# Patient Record
Sex: FEMALE | Race: WHITE | HISPANIC OR LATINO | Employment: FULL TIME | URBAN - METROPOLITAN AREA
[De-identification: names, ages, dates, MRNs, and addresses within clinical notes are randomized per-mention and may not be internally consistent; named-entity substitution may affect disease eponyms.]

---

## 2017-06-29 ENCOUNTER — APPOINTMENT (OUTPATIENT)
Dept: RADIOLOGY | Facility: MEDICAL CENTER | Age: 42
End: 2017-06-29
Attending: EMERGENCY MEDICINE
Payer: MEDICAID

## 2017-06-29 ENCOUNTER — HOSPITAL ENCOUNTER (EMERGENCY)
Facility: MEDICAL CENTER | Age: 42
End: 2017-06-29
Attending: EMERGENCY MEDICINE
Payer: MEDICAID

## 2017-06-29 VITALS
SYSTOLIC BLOOD PRESSURE: 124 MMHG | OXYGEN SATURATION: 97 % | WEIGHT: 187.83 LBS | DIASTOLIC BLOOD PRESSURE: 74 MMHG | BODY MASS INDEX: 35.46 KG/M2 | RESPIRATION RATE: 18 BRPM | HEART RATE: 80 BPM | TEMPERATURE: 97.9 F | HEIGHT: 61 IN

## 2017-06-29 DIAGNOSIS — D25.9 UTERINE LEIOMYOMA, UNSPECIFIED LOCATION: ICD-10-CM

## 2017-06-29 LAB
APPEARANCE UR: CLEAR
COLOR UR AUTO: YELLOW
GLUCOSE UR QL STRIP.AUTO: NEGATIVE MG/DL
KETONES UR QL STRIP.AUTO: NEGATIVE MG/DL
LEUKOCYTE ESTERASE UR QL STRIP.AUTO: NEGATIVE
NITRITE UR QL STRIP.AUTO: NEGATIVE
PH UR STRIP.AUTO: 5 [PH]
PROT UR QL STRIP: NEGATIVE MG/DL
RBC UR QL AUTO: ABNORMAL
SP GR UR: 1.01

## 2017-06-29 PROCEDURE — 76830 TRANSVAGINAL US NON-OB: CPT

## 2017-06-29 PROCEDURE — 99284 EMERGENCY DEPT VISIT MOD MDM: CPT

## 2017-06-29 PROCEDURE — 81002 URINALYSIS NONAUTO W/O SCOPE: CPT

## 2017-06-29 RX ORDER — LORATADINE 10 MG/1
10 TABLET ORAL DAILY
COMMUNITY

## 2017-06-29 RX ORDER — LEVOTHYROXINE SODIUM 0.1 MG/1
100 TABLET ORAL
COMMUNITY

## 2017-06-29 RX ORDER — TRAZODONE HYDROCHLORIDE 100 MG/1
100 TABLET ORAL NIGHTLY
COMMUNITY

## 2017-06-29 RX ORDER — GABAPENTIN 400 MG/1
400 CAPSULE ORAL 3 TIMES DAILY
Status: SHIPPED | COMMUNITY
End: 2022-09-12

## 2017-06-29 RX ORDER — HYDROCODONE BITARTRATE AND ACETAMINOPHEN 5; 325 MG/1; MG/1
1-2 TABLET ORAL EVERY 4 HOURS PRN
Qty: 20 TAB | Refills: 0 | Status: SHIPPED | OUTPATIENT
Start: 2017-06-29 | End: 2018-03-10

## 2017-06-29 RX ORDER — SIMVASTATIN 10 MG
10 TABLET ORAL NIGHTLY
COMMUNITY

## 2017-06-29 RX ORDER — MELOXICAM 7.5 MG/1
7.5 TABLET ORAL DAILY
Qty: 30 TAB | Refills: 0 | Status: SHIPPED | OUTPATIENT
Start: 2017-06-29 | End: 2018-03-10

## 2017-06-29 RX ORDER — IBUPROFEN 800 MG/1
800 TABLET ORAL EVERY 8 HOURS PRN
Status: SHIPPED | COMMUNITY
End: 2023-03-10

## 2017-06-29 RX ORDER — FLUTICASONE PROPIONATE 50 MCG
1 SPRAY, SUSPENSION (ML) NASAL DAILY
COMMUNITY

## 2017-06-29 RX ORDER — MAGNESIUM GLUCONATE 27 MG(500)
500 TABLET ORAL 3 TIMES DAILY
COMMUNITY

## 2017-06-29 RX ORDER — ERGOCALCIFEROL 1.25 MG/1
CAPSULE ORAL
COMMUNITY

## 2017-06-29 ASSESSMENT — PAIN SCALES - GENERAL: PAINLEVEL_OUTOF10: 5

## 2017-06-29 NOTE — ED PROVIDER NOTES
ED Provider Note    CHIEF COMPLAINT  Chief Complaint   Patient presents with   • Suprapubic Pain       HPI  Liz John is a 42 y.o. female who presents for evaluation of suprapubic pain. The patient states for the past year she has been having suprapubic region pain. Simply associated with her periods. It seems to be getting progressively worse and her periods have continue to get heavier as well. She started her period 2 days ago when she was having significant pain and she states when she sits down she has some rectal pressure with these episodes. She's had no fevers or chills. She's had no nausea or vomiting. She does not believe she is pregnant. She has no history of previous abdominal surgeries.    REVIEW OF SYSTEMS  See HPI for further details. All other systems are negative.     PAST MEDICAL HISTORY  Past Medical History   Diagnosis Date   • Seasonal allergies    • Hypothyroidism    • Hyperlipidemia    • Idiopathic neuropathy    • Migraine        FAMILY HISTORY  History reviewed. No pertinent family history.    SOCIAL HISTORY  Social History     Social History   • Marital Status: N/A     Spouse Name: N/A   • Number of Children: N/A   • Years of Education: N/A     Social History Main Topics   • Smoking status: Never Smoker    • Smokeless tobacco: Never Used   • Alcohol Use: No   • Drug Use: No   • Sexual Activity: Not Asked     Other Topics Concern   • None     Social History Narrative   • None       SURGICAL HISTORY  History reviewed. No pertinent past surgical history.    CURRENT MEDICATIONS  Home Medications     Reviewed by Angie Segal R.N. (Registered Nurse) on 06/29/17 at 0645  Med List Status: Complete    Medication Last Dose Status    albuterol (PROVENTIL) 2.5 mg/0.5 mL Nebu Soln  Active    fluticasone (FLONASE) 50 MCG/ACT nasal spray  Active    gabapentin (NEURONTIN) 400 MG Cap  Active    ibuprofen (MOTRIN) 800 MG Tab  Active    levothyroxine (SYNTHROID) 100 MCG Tab  Active    loratadine  "(CLARITIN) 10 MG Tab  Active    magnesium gluconate (MAG-G) 500 MG tablet  Active    simvastatin (ZOCOR) 10 MG Tab  Active    trazodone (DESYREL) 100 MG Tab  Active    vitamin D, Ergocalciferol, (DRISDOL) 88448 UNITS Cap capsule  Active                ALLERGIES  No Known Allergies    PHYSICAL EXAM  VITAL SIGNS: /78 mmHg  Pulse 85  Temp(Src) 36.3 °C (97.3 °F)  Resp 16  Ht 1.549 m (5' 1\")  Wt 85.2 kg (187 lb 13.3 oz)  BMI 35.51 kg/m2  SpO2 97%  LMP 06/27/2017    Constitutional: Well developed, Well nourished, No acute distress, Non-toxic appearance.   HENT: Normocephalic, Atraumatic.   Eyes:  EOMI, Conjunctiva normal, No discharge.   Cardiovascular: Normal heart rate.   Thorax & Lungs: No respiratory distress.   Abdomen: Soft, suprapubic tenderness without guarding or rebound. No masses appreciated.  Skin: Warm, Dry.   Musculoskeletal: Good range of motion in all major joints.  Neurologic: Awake alert.    RADIOLOGY/PROCEDURES  US-GYN-PELVIS TRANSVAGINAL   Final Result      1.  The endometrial stripe measures 7.3 mm in thickness.   2.  2.9 cm echogenic mass and smaller echogenic mass right lateral body appears most likely fibroids.   3.  The right ovary is not delineated.            COURSE & MEDICAL DECISION MAKING  Pertinent Labs & Imaging studies reviewed. (See chart for details)  This is a 42-year-old here for evaluation of suprapubic pain and heavy periods. Pelvic ultrasound shows multiple fibroid tumors. The largest is approximately 3 cm in diameter. No other masses or abnormalities or visualized. Urinalysis is positive for blood but no evidence of infection. Upon repeat evaluation the patient is resting comfortably. I discussed results of the tests with the patient and her . It is point I believe her symptoms most likely are related to her uterine fibroids. She's had progressive symptoms over the past year and now is having increasing pain and bleeding with her periods. I will have her " contact her primary care provider for follow-up. I'll provide her a prescription for meloxicam and Norco. I reviewed her prescription monitoring report. She is given a discharge instruction sheet on uterine fibroids. She is discharged home in stable condition in the care of her .    FINAL IMPRESSION  1. Pelvic pain  2. Menorrhagia  3. Uterine fibroids         Electronically signed by: Isael Gauthier, 6/29/2017 7:28 AM

## 2017-06-29 NOTE — ED AVS SNAPSHOT
Postcron Access Code: [email or web address]  Expires: 7/29/2017  9:33 AM    Your email address is not on file at [facility].  Email Addresses are required for you to sign up for Postcron, please contact [phone] to verify your personal information and to provide your email address prior to attempting to register for Postcron.    Liz John  [address] [place] [address]    Postcron  A secure, online tool to manage your health information     [facility]’s Postcron® is a secure, online tool that connects you to your personalized health information from the privacy of your home -- day or night - making it very easy for you to manage your healthcare. Once the activation process is completed, you can even access your medical information using the Postcron charli, which is available for free in the Apple Charli store or Google Play store.     To learn more about Postcron, visit [email or web address]    There are two levels of access available (as shown below):   My Chart Features  [facility] Primary Care Doctor [facility]  Specialists [facility]  Urgent  Care Non-[facility] Primary Care Doctor   Email your healthcare team securely and privately 24/7 X X X    Manage appointments: schedule your next appointment; view details of past/upcoming appointments X      Request prescription refills. X      View recent personal medical records, including lab and immunizations X X X X   View health record, including health history, allergies, medications X X X X   Read reports about your outpatient visits, procedures, consult and ER notes X X X X   See your discharge summary, which is a recap of your hospital and/or ER visit that includes your diagnosis, lab results, and care plan X X  X     How to register for Netcipiat:  Once your e-mail address has been verified, follow the following steps to sign up for Netcipiat.     1. Go to  [email or web address]  2. Click on the Sign Up Now box, which takes you to the New Member Sign Up page. You will need to  provide the following information:  a. Enter your Moneero Access Code exactly as it appears at the top of this page. (You will not need to use this code after you’ve completed the sign-up process. If you do not sign up before the expiration date, you must request a new code.)   b. Enter your date of birth.   c. Enter your home email address.   d. Click Submit, and follow the next screen’s instructions.  3. Create a Moneero ID. This will be your Moneero login ID and cannot be changed, so think of one that is secure and easy to remember.  4. Create a Moneero password. You can change your password at any time.  5. Enter your Password Reset Question and Answer. This can be used at a later time if you forget your password.   6. Enter your e-mail address. This allows you to receive e-mail notifications when new information is available in Moneero.  7. Click Sign Up. You can now view your health information.    For assistance activating your Moneero account, call [phone]

## 2017-06-29 NOTE — ED AVS SNAPSHOT
Home Care Instructions                                                                                                                Liz John   MRN: [id number]    Department:  [facility], Emergency Dept   Date of Visit:  6/29/2017            [facility], Emergency Dept    [address]    [place] [address]    Phone:  [phone]      You were seen by     Isael Gauthier M.D.      Your Diagnosis Was     Uterine leiomyoma, unspecified location     D25.9       Follow-up Information     1. Schedule an appointment as soon as possible for a visit with your ObGyn.      Medication Information     Review all of your home medications and newly ordered medications with your primary doctor and/or pharmacist as soon as possible. Follow medication instructions as directed by your doctor and/or pharmacist.     Please keep your complete medication list with you and share with your physician. Update the information when medications are discontinued, doses are changed, or new medications (including over-the-counter products) are added; and carry medication information at all times in the event of emergency situations.               Medication List      START taking these medications        Instructions    Morning Afternoon Evening Bedtime    hydrocodone-acetaminophen 5-325 MG Tabs per tablet   Commonly known as:  NORCO        Take 1-2 Tabs by mouth every four hours as needed.   Dose:  1-2 Tab                        meloxicam 7.5 MG Tabs   Commonly known as:  MOBIC        Take 1 Tab by mouth every day.   Dose:  7.5 mg                          ASK your doctor about these medications        Instructions    Morning Afternoon Evening Bedtime    albuterol 2.5 mg/0.5 mL Nebu   Commonly known as:  PROVENTIL        by Nebulization route ONCE (RT).                        fluticasone 50 MCG/ACT nasal spray   Commonly known as:  FLONASE        Spray 1 Spray in nose every day.   Dose:  1 Spray                       gabapentin 400 MG Caps   Commonly known as:  NEURONTIN        Take 400 mg by mouth 3 times a day.   Dose:  400 mg                        ibuprofen 800 MG Tabs   Commonly known as:  MOTRIN        Take 800 mg by mouth every 8 hours as needed.   Dose:  800 mg                        levothyroxine 100 MCG Tabs   Commonly known as:  SYNTHROID        Take 100 mcg by mouth Every morning on an empty stomach.   Dose:  100 mcg                        loratadine 10 MG Tabs   Commonly known as:  CLARITIN        Take 10 mg by mouth every day.   Dose:  10 mg                        magnesium gluconate 500 MG tablet   Commonly known as:  MAG-G        Take 500 mg by mouth 3 times a day.   Dose:  500 mg                        simvastatin 10 MG Tabs   Commonly known as:  ZOCOR        Take 10 mg by mouth every evening.   Dose:  10 mg                        trazodone 100 MG Tabs   Commonly known as:  DESYREL        Take 100 mg by mouth every evening.   Dose:  100 mg                        vitamin D (Ergocalciferol) 66420 UNITS Caps capsule   Commonly known as:  DRISDOL        Take  by mouth every 7 days.                             Where to Get Your Medications      You can get these medications from any pharmacy     Bring a paper prescription for each of these medications    - hydrocodone-acetaminophen 5-325 MG Tabs per tablet  - meloxicam 7.5 MG Tabs            Procedures and tests performed during your visit     POC UA    US-GYN-PELVIS TRANSVAGINAL        Discharge Instructions       Uterine Fibroids  Uterine fibroids are tissue masses (tumors) that can develop in the womb (uterus). They are also called leiomyomas. This type of tumor is not cancerous (benign) and does not spread to other parts of the body outside of the pelvic area, which is between the hip bones. Occasionally, fibroids may develop in the fallopian tubes, in the cervix, or on the support structures (ligaments) that surround the uterus.  You can have one  or many fibroids. Fibroids can vary in size, weight, and where they grow in the uterus. Some can become quite large. Most fibroids do not require medical treatment.  CAUSES  A fibroid can develop when a single uterine cell keeps growing (replicating). Most cells in the human body have a control mechanism that keeps them from replicating without control.  SIGNS AND SYMPTOMS  Symptoms may include:   · Heavy bleeding during your period.  · Bleeding or spotting between periods.  · Pelvic pain and pressure.  · Bladder problems, such as needing to urinate more often (urinary frequency) or urgently.  · Inability to reproduce offspring (infertility).  · Miscarriages.  DIAGNOSIS  Uterine fibroids are diagnosed through a physical exam. Your health care provider may feel the lumpy tumors during a pelvic exam. Ultrasonography and an MRI may be done to determine the size, location, and number of fibroids.  TREATMENT  Treatment may include:  · Watchful waiting. This involves getting the fibroid checked by your health care provider to see if it grows or shrinks. Follow your health care provider's recommendations for how often to have this checked.  · Hormone medicines. These can be taken by mouth or given through an intrauterine device (IUD).  · Surgery.  ¨ Removing the fibroids (myomectomy) or the uterus (hysterectomy).  ¨ Removing blood supply to the fibroids (uterine artery embolization).  If fibroids interfere with your fertility and you want to become pregnant, your health care provider may recommend having the fibroids removed.   HOME CARE INSTRUCTIONS  · Keep all follow-up visits as directed by your health care provider. This is important.  · Take medicines only as directed by your health care provider.  ¨ If you were prescribed a hormone treatment, take the hormone medicines exactly as directed.  ¨ Do not take aspirin, because it can cause bleeding.  · Ask your health care provider about taking iron pills and increasing  the amount of dark green, leafy vegetables in your diet. These actions can help to boost your blood iron levels, which may be affected by heavy menstrual bleeding.  · Pay close attention to your period and tell your health care provider about any changes, such as:  ¨ Increased blood flow that requires you to use more pads or tampons than usual per month.  ¨ A change in the number of days that your period lasts per month.  ¨ A change in symptoms that are associated with your period, such as abdominal cramping or back pain.  SEEK MEDICAL CARE IF:  · You have pelvic pain, back pain, or abdominal cramps that cannot be controlled with medicines.  · You have an increase in bleeding between and during periods.  · You soak tampons or pads in a half hour or less.  · You feel lightheaded, extra tired, or weak.  SEEK IMMEDIATE MEDICAL CARE IF:  · You faint.  · You have a sudden increase in pelvic pain.     This information is not intended to replace advice given to you by your health care provider. Make sure you discuss any questions you have with your health care provider.     Document Released: 12/15/2001 Document Revised: 01/08/2016 Document Reviewed: 06/16/2015  GroupVisual.[email or web address] Interactive Patient Education ©2016 [employer]            Patient Information     Patient Information    Following emergency treatment: all patient requiring follow-up care must return either to a private physician or a clinic if your condition worsens before you are able to obtain further medical attention, please return to the emergency room.     Billing Information    At [facility], we work to make the billing process streamlined for our patients.  Our Representatives are here to answer any questions you may have regarding your hospital bill.  If you have insurance coverage and have supplied your insurance information to us, we will submit a claim to your insurer on your behalf.  Should you have any questions regarding your bill, we can be reached  online or by phone as follows:  Online: You are able pay your bills online or live chat with our representatives about any billing questions you may have. We are here to help Monday - Friday from 8:00am to 7:30pm and 9:00am - 12:00pm on Saturdays.  Please visit [email or web address]  for more information.   Phone:  [phone] or [phone]    Please note that your emergency physician, surgeon, pathologist, radiologist, anesthesiologist, and other specialists are not employed by [facility] and will therefore bill separately for their services.  Please contact them directly for any questions concerning their bills at the numbers below:     Emergency Physician Services:  [phone]  [facility]:  [phone]  [facility]:  [phone]  [facility]:  [phone]    1. Your final bill may vary from the amount quoted upon discharge if all procedures are not complete at that time, or if your doctor has additional procedures of which we are not aware. You will receive an additional bill if you return to the Emergency Department at [facility] for suture removal regardless of the facility of which the sutures were placed.     2. Please arrange for settlement of this account at the emergency registration.    3. All self-pay accounts are due in full at the time of treatment.  If you are unable to meet this obligation then payment is expected within 4-5 days.     4. If you have had radiology studies (CT, X-ray, Ultrasound, MRI), you have received a preliminary result during your emergency department visit. Please contact the radiology department [phone] to receive a copy of your final result. Please discuss the Final result with your primary physician or with the follow up physician provided.     Crisis Hotline:  [place] Crisis Hotline:  8-604-NACBIGO or 1-367.739.3004  [place] Crisis Hotline:    1-742.581.4631 or 519-208-6256          ED Discharge Follow Up Questions    1. In order to provide you with very good care, we would like to follow up with a phone call in the next few days.  May we have your permission to contact you?     YES /  NO    2. What is the best phone number to call you? (       )_____-__________    3. What is the best time to call you?      Morning  /  Afternoon  /  Evening                   Patient Signature:  ____________________________________________________________    Date:  ____________________________________________________________

## 2017-06-29 NOTE — ED AVS SNAPSHOT
6/29/2017    Liz John  [address] [place] [address]    Dear Liz:    [facility] wants to ensure your discharge home is safe and you or your loved ones have had all of your questions answered regarding your care after you leave the hospital.    Below is a list of resources and contact information should you have any questions regarding your hospital stay, follow-up instructions, or active medical symptoms.    Questions or Concerns Regarding… Contact   Medical Questions Related to Your Discharge  (7 days a week, 8am-5pm) Contact a Nurse Care Coordinator   [phone]   Medical Questions Not Related to Your Discharge  (24 hours a day / 7 days a week)  Contact the Nurse Health Line   [phone]    Medications or Discharge Instructions Refer to your discharge packet   or contact your [facility] Primary Care Provider   [phone]   Follow-up Appointment(s) Schedule your appointment via [employer]   or contact Scheduling [phone]   Billing Review your statement via [employer]  or contact Billing [phone]   Medical Records Review your records via [employer]   or contact Medical Records [phone]     You may receive a telephone call within two days of discharge. This call is to make certain you understand your discharge instructions and have the opportunity to have any questions answered. You can also easily access your medical information, test results and upcoming appointments via the [employer] free online health management tool. You can learn more and sign up at [employer]. For assistance setting up your [employer] account, please call [phone].    Once again, we want to ensure your discharge home is safe and that you have a clear understanding of any next steps in your care. If you have any questions or concerns, please do not hesitate to contact us, we are here for you. Thank you for choosing [facility] for your healthcare needs.    Sincerely,    Your [facility] Healthcare Team

## 2017-06-29 NOTE — DISCHARGE INSTRUCTIONS
Uterine Fibroids  Uterine fibroids are tissue masses (tumors) that can develop in the womb (uterus). They are also called leiomyomas. This type of tumor is not cancerous (benign) and does not spread to other parts of the body outside of the pelvic area, which is between the hip bones. Occasionally, fibroids may develop in the fallopian tubes, in the cervix, or on the support structures (ligaments) that surround the uterus.  You can have one or many fibroids. Fibroids can vary in size, weight, and where they grow in the uterus. Some can become quite large. Most fibroids do not require medical treatment.  CAUSES  A fibroid can develop when a single uterine cell keeps growing (replicating). Most cells in the human body have a control mechanism that keeps them from replicating without control.  SIGNS AND SYMPTOMS  Symptoms may include:   · Heavy bleeding during your period.  · Bleeding or spotting between periods.  · Pelvic pain and pressure.  · Bladder problems, such as needing to urinate more often (urinary frequency) or urgently.  · Inability to reproduce offspring (infertility).  · Miscarriages.  DIAGNOSIS  Uterine fibroids are diagnosed through a physical exam. Your health care provider may feel the lumpy tumors during a pelvic exam. Ultrasonography and an MRI may be done to determine the size, location, and number of fibroids.  TREATMENT  Treatment may include:  · Watchful waiting. This involves getting the fibroid checked by your health care provider to see if it grows or shrinks. Follow your health care provider's recommendations for how often to have this checked.  · Hormone medicines. These can be taken by mouth or given through an intrauterine device (IUD).  · Surgery.  ¨ Removing the fibroids (myomectomy) or the uterus (hysterectomy).  ¨ Removing blood supply to the fibroids (uterine artery embolization).  If fibroids interfere with your fertility and you want to become pregnant, your health care provider  may recommend having the fibroids removed.   HOME CARE INSTRUCTIONS  · Keep all follow-up visits as directed by your health care provider. This is important.  · Take medicines only as directed by your health care provider.  ¨ If you were prescribed a hormone treatment, take the hormone medicines exactly as directed.  ¨ Do not take aspirin, because it can cause bleeding.  · Ask your health care provider about taking iron pills and increasing the amount of dark green, leafy vegetables in your diet. These actions can help to boost your blood iron levels, which may be affected by heavy menstrual bleeding.  · Pay close attention to your period and tell your health care provider about any changes, such as:  ¨ Increased blood flow that requires you to use more pads or tampons than usual per month.  ¨ A change in the number of days that your period lasts per month.  ¨ A change in symptoms that are associated with your period, such as abdominal cramping or back pain.  SEEK MEDICAL CARE IF:  · You have pelvic pain, back pain, or abdominal cramps that cannot be controlled with medicines.  · You have an increase in bleeding between and during periods.  · You soak tampons or pads in a half hour or less.  · You feel lightheaded, extra tired, or weak.  SEEK IMMEDIATE MEDICAL CARE IF:  · You faint.  · You have a sudden increase in pelvic pain.     This information is not intended to replace advice given to you by your health care provider. Make sure you discuss any questions you have with your health care provider.     Document Released: 12/15/2001 Document Revised: 01/08/2016 Document Reviewed: 06/16/2015  [employer] Interactive Patient Education ©2016 [employer].

## 2017-06-29 NOTE — ED NOTES
Pt to room Y65. Agree with triage note. Pt states pain in lower abdomen and radiates towards rectum when sitting. Pt states pain has been ongoing for past few years, which as increased with intensity. States using NSAIDS which help but has not taken any this morning. Chart up for eval by ERP.

## 2017-06-29 NOTE — ED NOTES
"Liz John  42 y.o.  Chief Complaint   Patient presents with   • Suprapubic Pain       Ambulatory to triage with above complaint. Patient states that for the past year during menstruation she has experienced increasing pain/pressure to her suprapubic area, with increasingly heavy menstrual bleeding. Patient currently menstruating, uses 4 super size tampons daily. Also states that whenever she sits down she has \"extreme rectal pressure.\" Complains of slight dysuria. Patient states that symptoms only manifest during menstruation, and that she is asymptomatic when not menstruating.    Patient saw her gyn 1 month ago but this problem was not addressed at that time.      Triage process explained to patient, apologized for wait time, and returned to [facility].  "

## 2018-02-22 ENCOUNTER — OFFICE VISIT (OUTPATIENT)
Dept: URGENT CARE | Facility: PHYSICIAN GROUP | Age: 43
End: 2018-02-22
Payer: COMMERCIAL

## 2018-02-22 VITALS
TEMPERATURE: 97.8 F | BODY MASS INDEX: 37.49 KG/M2 | SYSTOLIC BLOOD PRESSURE: 114 MMHG | HEART RATE: 72 BPM | WEIGHT: 198.6 LBS | OXYGEN SATURATION: 96 % | DIASTOLIC BLOOD PRESSURE: 90 MMHG | HEIGHT: 61 IN

## 2018-02-22 DIAGNOSIS — Z20.828 EXPOSURE TO INFLUENZA: Primary | ICD-10-CM

## 2018-02-22 DIAGNOSIS — R05.9 COUGH: ICD-10-CM

## 2018-02-22 PROCEDURE — 99203 OFFICE O/P NEW LOW 30 MIN: CPT | Performed by: PHYSICIAN ASSISTANT

## 2018-02-22 RX ORDER — OSELTAMIVIR PHOSPHATE 75 MG/1
75 CAPSULE ORAL 2 TIMES DAILY
Qty: 10 CAP | Refills: 0 | Status: SHIPPED | OUTPATIENT
Start: 2018-02-22 | End: 2018-03-10

## 2018-02-22 NOTE — LETTER
February 22, 2018         Patient: Liz John   YOB: 1975   Date of Visit: 2/22/2018           To Whom it May Concern:    Liz John was seen in my clinic on 2/22/2018. She may return to work on 02/26/2018.     If you have any questions or concerns, please don't hesitate to call.        Sincerely,           Marissa Myers P.A.-C.  Electronically Signed

## 2018-02-26 ASSESSMENT — ENCOUNTER SYMPTOMS: COUGH: 1

## 2018-02-26 NOTE — PROGRESS NOTES
Subjective:      Liz John is a 42 y.o. female who presents with Cough (x2 weeks )    PMH:  has a past medical history of Hyperlipidemia; Hypothyroidism; Idiopathic neuropathy; Migraine; and Seasonal allergies. She also has no past medical history of Diabetes (CMS-[facility]) or Hypertension.  MEDS:   Current Outpatient Prescriptions:   •  oseltamivir (TAMIFLU) 75 MG Cap, Take 1 Cap by mouth 2 times a day., Disp: 10 Cap, Rfl: 0  •  ibuprofen (MOTRIN) 800 MG Tab, Take 800 mg by mouth every 8 hours as needed., Disp: , Rfl:   •  albuterol (PROVENTIL) 2.5 mg/0.5 mL Nebu Soln, by Nebulization route ONCE (RT)., Disp: , Rfl:   •  loratadine (CLARITIN) 10 MG Tab, Take 10 mg by mouth every day., Disp: , Rfl:   •  fluticasone (FLONASE) 50 MCG/ACT nasal spray, Spray 1 Spray in nose every day., Disp: , Rfl:   •  gabapentin (NEURONTIN) 400 MG Cap, Take 400 mg by mouth 3 times a day., Disp: , Rfl:   •  levothyroxine (SYNTHROID) 100 MCG Tab, Take 100 mcg by mouth Every morning on an empty stomach., Disp: , Rfl:   •  magnesium gluconate (MAG-G) 500 MG tablet, Take 500 mg by mouth 3 times a day., Disp: , Rfl:   •  simvastatin (ZOCOR) 10 MG Tab, Take 10 mg by mouth every evening., Disp: , Rfl:   •  vitamin D, Ergocalciferol, (DRISDOL) 07615 UNITS Cap capsule, Take  by mouth every 7 days., Disp: , Rfl:   •  trazodone (DESYREL) 100 MG Tab, Take 100 mg by mouth every evening., Disp: , Rfl:   •  meloxicam (MOBIC) 7.5 MG Tab, Take 1 Tab by mouth every day., Disp: 30 Tab, Rfl: 0  •  hydrocodone-acetaminophen (NORCO) 5-325 MG Tab per tablet, Take 1-2 Tabs by mouth every four hours as needed., Disp: 20 Tab, Rfl: 0  ALLERGIES: No Known Allergies  SURGHX: History reviewed. No pertinent surgical history.  SOCHX:  reports that she has never smoked. She has never used smokeless tobacco. She reports that she does not drink alcohol or use drugs.  FH: Reviewed with patient/family. Not pertinent to this complaint.            Pt co cough x 2 weeks which has  "been slowly improving, but now she has 2 family members who have tested positive for influenza A yesterday.  Pt is requesting Tamiflu.        Cough   This is a new problem. The current episode started 1 to 4 weeks ago. The problem has been unchanged. The problem occurs every few minutes. The cough is non-productive. Nothing aggravates the symptoms. She has tried OTC cough suppressant and rest for the symptoms. The treatment provided mild relief. Her past medical history is significant for bronchitis.       Review of Systems   Respiratory: Positive for cough.    All other systems reviewed and are negative.         Objective:     /90   Pulse 72   Temp 36.6 °C (97.8 °F)   Ht 1.549 m (5' 1\")   Wt 90.1 kg (198 lb 9.6 oz)   SpO2 96%   BMI 37.53 kg/m²      Physical Exam   Constitutional: She is oriented to person, place, and time. She appears well-developed and well-nourished.   HENT:   Head: Normocephalic and atraumatic.   Mouth/Throat: Oropharynx is clear and moist.   Eyes: Conjunctivae and EOM are normal. Pupils are equal, round, and reactive to light.   Neck: Normal range of motion. Neck supple.   Cardiovascular: Normal rate, regular rhythm and normal heart sounds.    Pulmonary/Chest: Effort normal. No respiratory distress. She has rhonchi.   Abdominal: Soft.   Musculoskeletal: Normal range of motion.   Neurological: She is alert and oriented to person, place, and time. Gait normal.   Skin: Skin is warm and dry. Capillary refill takes less than 2 seconds.   Psychiatric: She has a normal mood and affect.   Nursing note and vitals reviewed.       Assessment/Plan:     1. Exposure to influenza  oseltamivir (TAMIFLU) 75 MG Cap   2. Cough  oseltamivir (TAMIFLU) 75 MG Cap     Pt given Rx for tamilfu based on household exposure.      Discussed red flags and reasons to return to UC or ED.  Pt and/or family verbalized understanding of diagnosis and follow up instructions and was offered informational handout on " diagnosis.  PT discharged.

## 2018-03-10 ENCOUNTER — OFFICE VISIT (OUTPATIENT)
Dept: URGENT CARE | Facility: PHYSICIAN GROUP | Age: 43
End: 2018-03-10
Payer: COMMERCIAL

## 2018-03-10 VITALS
SYSTOLIC BLOOD PRESSURE: 106 MMHG | HEART RATE: 102 BPM | WEIGHT: 199 LBS | TEMPERATURE: 99 F | BODY MASS INDEX: 37.57 KG/M2 | HEIGHT: 61 IN | OXYGEN SATURATION: 97 % | DIASTOLIC BLOOD PRESSURE: 60 MMHG

## 2018-03-10 DIAGNOSIS — J02.9 EXUDATIVE PHARYNGITIS: Primary | ICD-10-CM

## 2018-03-10 PROCEDURE — 99214 OFFICE O/P EST MOD 30 MIN: CPT | Performed by: NURSE PRACTITIONER

## 2018-03-10 RX ORDER — AMOXICILLIN 500 MG/1
500 CAPSULE ORAL 2 TIMES DAILY
Qty: 20 CAP | Refills: 0 | Status: SHIPPED | OUTPATIENT
Start: 2018-03-10 | End: 2018-03-20

## 2018-03-10 ASSESSMENT — ENCOUNTER SYMPTOMS
FEVER: 1
TROUBLE SWALLOWING: 1
SPUTUM PRODUCTION: 0
DIARRHEA: 0
NAUSEA: 0
MYALGIAS: 1
SWOLLEN GLANDS: 1
SORE THROAT: 1
WEAKNESS: 1
SHORTNESS OF BREATH: 0
VOMITING: 0
COUGH: 0
HEADACHES: 1
CHILLS: 1

## 2018-03-10 NOTE — PROGRESS NOTES
"Subjective:      Liz John is a 42 y.o. female who presents with Sore Throat (nasal congestion, body aches, headache, chills, x1 day)            Medications, Allergies and Prior Medical Hx reviewed and updated in [facility].with patient/family today           Pharyngitis    This is a new problem. The current episode started yesterday. The problem has been unchanged. The pain is at a severity of 7/10. Associated symptoms include congestion, headaches, swollen glands and trouble swallowing. Pertinent negatives include no coughing, diarrhea, ear discharge, ear pain, shortness of breath or vomiting. She has tried acetaminophen and NSAIDs for the symptoms. The treatment provided no relief.       Review of Systems   Constitutional: Positive for chills, fever and malaise/fatigue.   HENT: Positive for congestion, sore throat and trouble swallowing. Negative for ear discharge and ear pain.    Respiratory: Negative for cough, sputum production and shortness of breath.    Gastrointestinal: Negative for diarrhea, nausea and vomiting.   Musculoskeletal: Positive for myalgias.   Neurological: Positive for weakness and headaches.          Objective:     /60   Pulse (!) 102   Temp 37.2 °C (99 °F)   Ht 1.549 m (5' 1\")   Wt 90.3 kg (199 lb)   SpO2 97%   BMI 37.60 kg/m²      Physical Exam   Constitutional: She appears well-developed and well-nourished.   HENT:   Head: Normocephalic and atraumatic.   Right Ear: External ear normal.   Left Ear: External ear normal.   Nose: Rhinorrhea present.   Mouth/Throat: Uvula is midline and mucous membranes are normal. No trismus in the jaw. No uvula swelling. Posterior oropharyngeal edema and posterior oropharyngeal erythema present. Tonsils are 3+ on the right. Tonsils are 3+ on the left. Tonsillar exudate.   Eyes: Conjunctivae are normal. Pupils are equal, round, and reactive to light.   Neck: Neck supple.   Cardiovascular: Normal rate, regular rhythm and normal heart sounds.  "   Pulmonary/Chest: Effort normal and breath sounds normal. No respiratory distress.   Lymphadenopathy:     She has cervical adenopathy.   Neurological: She is alert.   Awake, alert, answering questions appropriately, moving all extremeties     Skin: Skin is warm and dry. Capillary refill takes less than 2 seconds.   Psychiatric: She has a normal mood and affect. Her behavior is normal.               Assessment/Plan:     1. Exudative pharyngitis  amoxicillin (AMOXIL) 500 MG Cap       Discuss with patient her symptoms are compatible with strep. Discussed treating based on symptoms versus doing a strep test. Patient prefers just to be treated based on her symptoms  Rest, Fluids, tylenol, ibuprofen, otc throat lozenges, gargle with warm salt water,   Pt will go to the ER for worsening or changing symptoms as discussed,  Follow-up with your primary care provider or return here if not improving in 5 days   Discharge instructions discussed with pt/family who verbalize understanding and agreement with poc

## 2018-09-12 ENCOUNTER — OFFICE VISIT (OUTPATIENT)
Dept: URGENT CARE | Facility: PHYSICIAN GROUP | Age: 43
End: 2018-09-12
Payer: COMMERCIAL

## 2018-09-12 VITALS
WEIGHT: 195 LBS | HEART RATE: 90 BPM | DIASTOLIC BLOOD PRESSURE: 80 MMHG | OXYGEN SATURATION: 97 % | BODY MASS INDEX: 36.82 KG/M2 | TEMPERATURE: 97.9 F | SYSTOLIC BLOOD PRESSURE: 114 MMHG | HEIGHT: 61 IN

## 2018-09-12 DIAGNOSIS — R09.A2 GLOBUS SENSATION: ICD-10-CM

## 2018-09-12 DIAGNOSIS — K21.00 GASTROESOPHAGEAL REFLUX DISEASE WITH ESOPHAGITIS: ICD-10-CM

## 2018-09-12 PROCEDURE — 99214 OFFICE O/P EST MOD 30 MIN: CPT | Performed by: PHYSICIAN ASSISTANT

## 2018-09-12 RX ORDER — METHYLPREDNISOLONE 4 MG/1
TABLET ORAL
Qty: 1 KIT | Refills: 0 | Status: SHIPPED | OUTPATIENT
Start: 2018-09-12 | End: 2022-09-12

## 2018-09-12 RX ORDER — OMEPRAZOLE 20 MG/1
20 CAPSULE, DELAYED RELEASE ORAL DAILY
Qty: 30 CAP | Refills: 1 | Status: SHIPPED | OUTPATIENT
Start: 2018-09-12 | End: 2022-09-12

## 2018-09-12 ASSESSMENT — ENCOUNTER SYMPTOMS
BLOOD IN STOOL: 0
VOMITING: 0
CONSTIPATION: 0
CHILLS: 0
ABDOMINAL PAIN: 0
WHEEZING: 0
HEARTBURN: 0
DIARRHEA: 1
FEVER: 0
NAUSEA: 0
SHORTNESS OF BREATH: 0
HEADACHES: 1

## 2018-09-12 NOTE — PATIENT INSTRUCTIONS
Food Choices for Gastroesophageal Reflux Disease, Adult  When you have gastroesophageal reflux disease (GERD), the foods you eat and your eating habits are very important. Choosing the right foods can help ease the discomfort of GERD.  WHAT GENERAL GUIDELINES DO I NEED TO FOLLOW?  · Choose fruits, vegetables, whole grains, low-fat dairy products, and low-fat meat, fish, and poultry.  · Limit fats such as oils, salad dressings, butter, nuts, and avocado.  · Keep a food diary to identify foods that cause symptoms.  · Avoid foods that cause reflux. These may be different for different people.  · Eat frequent small meals instead of three large meals each day.  · Eat your meals slowly, in a relaxed setting.  · Limit fried foods.  · Cook foods using methods other than frying.  · Avoid drinking alcohol.  · Avoid drinking large amounts of liquids with your meals.  · Avoid bending over or lying down until 2-3 hours after eating.  WHAT FOODS ARE NOT RECOMMENDED?  The following are some foods and drinks that may worsen your symptoms:  Vegetables  Tomatoes. Tomato juice. Tomato and spaghetti sauce. Chili peppers. Onion and garlic. Horseradish.  Fruits  Oranges, grapefruit, and lemon (fruit and juice).  Meats  High-fat meats, fish, and poultry. This includes hot dogs, ribs, ham, sausage, salami, and hester.  Dairy  Whole milk and chocolate milk. Sour cream. Cream. Butter. Ice cream. Cream cheese.   Beverages  Coffee and tea, with or without caffeine. Carbonated beverages or energy drinks.  Condiments  Hot sauce. Barbecue sauce.   Sweets/Desserts  Chocolate and cocoa. Donuts. Peppermint and spearmint.  Fats and Oils  High-fat foods, including French fries and potato chips.  Other  Vinegar. Strong spices, such as black pepper, white pepper, red pepper, cayenne, dove powder, cloves, ginger, and chili powder.  The items listed above may not be a complete list of foods and beverages to avoid. Contact your dietitian for more  information.     This information is not intended to replace advice given to you by your health care provider. Make sure you discuss any questions you have with your health care provider.     Document Released: 12/18/2006 Document Revised: 01/08/2016 Document Reviewed: 10/22/2014  [employer] Interactive Patient Education ©2016 [employer]

## 2018-09-12 NOTE — PROGRESS NOTES
"Subjective:   Alexis John is a 43 y.o. female who presents for Headache (Throat feels like something is stuck in the throat, discomfort at the Rt temple that feels like it is locking, excessive drooling, feeling of dehydration for 3 days )        Headache    This is a new problem. The current episode started in the past 7 days. Pertinent negatives include no abdominal pain, fever, nausea or vomiting.     Notes globus sensation, lot's of burping, some acid production w/ burping, notes PMH of similar but not as bad, notes some excessive saliva, feels dry mouth, denies sensation of swelling to mouth or throat, notes swallowing saliva, denies any drooling, PMH of similar w/in last one year, MD thought likely GERD w/ PMH of similar, notes sensation of globus both w/ liquid and solid, worse w/ solids, notes mild pain to left jaw, \"almost was locked last night but was not\", denies fever/chills/cough/ST, denies ear pain or sinus congestion, tried two sudafed yesterday and today for drying up of saliva, denies abd discomfort, notes PMH of chronic diarrhea, no change recently, denies blood per stool/melena, denies recent dietary changes, single PMH of GERD,. Denies other. Tried only sudafed. Denies PMH of abd surgery. Denies chest pain/shortness of breath, denies noting PMH of palpitations recently. Does have seasonal allerg - no tx. 3yrs ago - broke out in hives - takes claritin daily. Pt notes mild sinus frontal HA - similar to migraines in the past, denies photo/phono phobia. Denies visual changes or nausea.     Review of Systems   Constitutional: Negative for chills and fever.   Respiratory: Negative for shortness of breath and wheezing.    Gastrointestinal: Positive for diarrhea ( baseline for pt). Negative for abdominal pain, blood in stool, constipation, heartburn ( ?? possibly?), melena, nausea and vomiting.   Skin: Negative for rash.   Neurological: Positive for headaches.     No Known Allergies   Objective:   BP " "114/80   Pulse 90   Temp 36.6 °C (97.9 °F)   Ht 1.549 m (5' 1\")   Wt 88.5 kg (195 lb)   SpO2 97%   BMI 36.84 kg/m²   Physical Exam   Constitutional: She is oriented to person, place, and time. She appears well-developed and well-nourished. No distress.   HENT:   Head: Normocephalic and atraumatic.   Right Ear: Tympanic membrane, external ear and ear canal normal.   Left Ear: Tympanic membrane, external ear and ear canal normal.   Nose: Nose normal.   Mouth/Throat: Uvula is midline and mucous membranes are normal. Mucous membranes are not dry. No oral lesions. No trismus in the jaw. Normal dentition. No dental abscesses or uvula swelling. Posterior oropharyngeal erythema ( mild PND, no exudate or edema throughout) present. No oropharyngeal exudate, posterior oropharyngeal edema or tonsillar abscesses. Tonsils are 0 on the right. Tonsils are 0 on the left. No tonsillar exudate.   Eyes: Conjunctivae and lids are normal. Right eye exhibits no discharge. Left eye exhibits no discharge. No scleral icterus.   Neck: Neck supple.   Pulmonary/Chest: Effort normal and breath sounds normal. No respiratory distress. She has no decreased breath sounds. She has no wheezes. She has no rhonchi. She has no rales. She exhibits no tenderness.   Abdominal: Soft. Bowel sounds are normal. She exhibits no distension. There is no tenderness. There is no guarding.   Musculoskeletal: Normal range of motion.   Lymphadenopathy:     She has cervical adenopathy ( mild bilat).   Neurological: She is alert and oriented to person, place, and time. She has normal strength. She is not disoriented. No cranial nerve deficit or sensory deficit. She displays a negative Romberg sign. Coordination normal.   Skin: Skin is warm and dry. She is not diaphoretic. No erythema. No pallor.   Psychiatric: Her speech is normal and behavior is normal.   Nursing note and vitals reviewed.        Assessment/Plan:   Assessment    1. Globus sensation  - " MethylPREDNISolone (MEDROL DOSEPAK) 4 MG Tablet Therapy Pack; Take as directed on package.  Dispense one package.  Dispense: 1 Kit; Refill: 0  - REFERRAL TO ENT    2. Gastroesophageal reflux disease with esophagitis  - omeprazole (PRILOSEC) 20 MG delayed-release capsule; Take 1 Cap by mouth every day.  Dispense: 30 Cap; Refill: 1    Supportive care is reviewed with patient/caregiver - recommend to push PO fluids and electrolytes, unclear etiology of globus sensation, will cover w/ medrol, ENT referral and to ER w/ ANY sensation of swelling or inability to control saliva/secretions  Cautioned regarding potential side effects of steroid, avoid nsaids while using    Will trial tx for GERD now; zantac/ prilosec now, GERD diet sent w/ handout regarding GERD s/sx and diet - make f/u appt w/ PCP - Return to clinic with lack of resolution or progression of symptoms.  ER precautions with any worsening symptoms are reviewed with patient/caregiver and they do express understanding      Differential diagnosis, natural history, supportive care, and indications for immediate follow-up discussed.

## 2018-12-14 ENCOUNTER — OFFICE VISIT (OUTPATIENT)
Dept: URGENT CARE | Facility: PHYSICIAN GROUP | Age: 43
End: 2018-12-14
Payer: COMMERCIAL

## 2018-12-14 VITALS
BODY MASS INDEX: 36.82 KG/M2 | WEIGHT: 195 LBS | HEIGHT: 61 IN | RESPIRATION RATE: 18 BRPM | SYSTOLIC BLOOD PRESSURE: 116 MMHG | OXYGEN SATURATION: 98 % | TEMPERATURE: 97.7 F | HEART RATE: 80 BPM | DIASTOLIC BLOOD PRESSURE: 70 MMHG

## 2018-12-14 DIAGNOSIS — M62.830 MUSCLE SPASM OF BACK: ICD-10-CM

## 2018-12-14 PROCEDURE — 99214 OFFICE O/P EST MOD 30 MIN: CPT | Performed by: PHYSICIAN ASSISTANT

## 2018-12-14 RX ORDER — METHOCARBAMOL 500 MG/1
500 TABLET, FILM COATED ORAL 3 TIMES DAILY
Qty: 45 TAB | Refills: 0 | Status: SHIPPED | OUTPATIENT
Start: 2018-12-14 | End: 2022-09-12

## 2018-12-14 ASSESSMENT — ENCOUNTER SYMPTOMS
PARESIS: 0
TINGLING: 0
BACK PAIN: 1
LEG PAIN: 0
HEADACHES: 0
BOWEL INCONTINENCE: 0
ABDOMINAL PAIN: 0
PERIANAL NUMBNESS: 0
WEAKNESS: 0
NUMBNESS: 0

## 2018-12-15 NOTE — PROGRESS NOTES
Subjective:      Alexis John is a 43 y.o. female who presents with Back Pain (start a week ago, pain is every where in her back, started in the tailbone moved across her hips radiatin up. )            Back Pain   This is a new problem. The current episode started in the past 7 days. The problem occurs constantly. The problem has been gradually improving since onset. The pain is present in the lumbar spine. The quality of the pain is described as aching. The pain does not radiate. The pain is at a severity of 3/10. The pain is mild. The symptoms are aggravated by bending and twisting. Pertinent negatives include no abdominal pain, bladder incontinence, bowel incontinence, chest pain, dysuria, headaches, leg pain, numbness, paresis, pelvic pain, perianal numbness, tingling or weakness. She has tried chiropractic manipulation, NSAIDs and bed rest for the symptoms. The treatment provided mild relief.   Atraumatic bilateral SI joint tenderness.  No midline tenderness.  History of similar.      PMH:  has a past medical history of Hyperlipidemia; Hypothyroidism; Idiopathic neuropathy; Migraine; and Seasonal allergies. She also has no past medical history of Diabetes (HCC) or Hypertension.  MEDS:   Current Outpatient Prescriptions:   •  methocarbamol (ROBAXIN) 500 MG Tab, Take 1 Tab by mouth 3 times a day., Disp: 45 Tab, Rfl: 0  •  MethylPREDNISolone (MEDROL DOSEPAK) 4 MG Tablet Therapy Pack, Take as directed on package.  Dispense one package., Disp: 1 Kit, Rfl: 0  •  omeprazole (PRILOSEC) 20 MG delayed-release capsule, Take 1 Cap by mouth every day., Disp: 30 Cap, Rfl: 1  •  ibuprofen (MOTRIN) 800 MG Tab, Take 800 mg by mouth every 8 hours as needed., Disp: , Rfl:   •  albuterol (PROVENTIL) 2.5 mg/0.5 mL Nebu Soln, by Nebulization route ONCE (RT)., Disp: , Rfl:   •  loratadine (CLARITIN) 10 MG Tab, Take 10 mg by mouth every day., Disp: , Rfl:   •  fluticasone (FLONASE) 50 MCG/ACT nasal spray, Spray 1 Spray in nose every  "day., Disp: , Rfl:   •  gabapentin (NEURONTIN) 400 MG Cap, Take 400 mg by mouth 3 times a day., Disp: , Rfl:   •  levothyroxine (SYNTHROID) 100 MCG Tab, Take 100 mcg by mouth Every morning on an empty stomach., Disp: , Rfl:   •  magnesium gluconate (MAG-G) 500 MG tablet, Take 500 mg by mouth 3 times a day., Disp: , Rfl:   •  simvastatin (ZOCOR) 10 MG Tab, Take 10 mg by mouth every evening., Disp: , Rfl:   •  vitamin D, Ergocalciferol, (DRISDOL) 41013 UNITS Cap capsule, Take  by mouth every 7 days., Disp: , Rfl:   •  trazodone (DESYREL) 100 MG Tab, Take 100 mg by mouth every evening., Disp: , Rfl:   ALLERGIES: No Known Allergies  SURGHX:   Past Surgical History:   Procedure Laterality Date   • ABDOMINAL HYSTERECTOMY TOTAL       SOCHX:  reports that she has never smoked. She has never used smokeless tobacco. She reports that she does not drink alcohol or use drugs.  FH: family history is not on file.    Review of Systems   Cardiovascular: Negative for chest pain.   Gastrointestinal: Negative for abdominal pain and bowel incontinence.   Genitourinary: Negative for bladder incontinence, dysuria and pelvic pain.   Musculoskeletal: Positive for back pain.   Neurological: Negative for tingling, weakness, numbness and headaches.       Medications, Allergies, and current problem list reviewed today in Epic     Objective:     /70 (BP Location: Right arm, Patient Position: Sitting, BP Cuff Size: Large adult)   Pulse 80   Temp 36.5 °C (97.7 °F) (Temporal)   Resp 18   Ht 1.549 m (5' 1\")   Wt 88.5 kg (195 lb)   SpO2 98%   BMI 36.84 kg/m²      Physical Exam   Constitutional: She is oriented to person, place, and time. She appears well-developed and well-nourished. No distress.   HENT:   Head: Normocephalic and atraumatic.   Eyes: Conjunctivae and EOM are normal.   Neck: Normal range of motion. Neck supple.   Cardiovascular: Normal rate, regular rhythm and normal heart sounds.    Pulmonary/Chest: Effort normal and " breath sounds normal. No respiratory distress. She has no wheezes. She has no rales.   Abdominal: Soft. She exhibits no distension. There is no tenderness.   Musculoskeletal:        Lumbar back: She exhibits tenderness, pain and spasm. She exhibits normal range of motion, no bony tenderness, no swelling and no edema.        Back:    Bilateral SI joint tenderness.  No swelling, deformity or bruising seen.  No midline tenderness.  Range of motion hips within normal limits.  Lower extremity strength and DTRs equal.   Neurological: She is alert and oriented to person, place, and time.   Skin: Skin is warm and dry. She is not diaphoretic.   Psychiatric: She has a normal mood and affect. Her behavior is normal. Judgment and thought content normal.   Nursing note and vitals reviewed.              Assessment/Plan:     1. Muscle spasm of back  methocarbamol (ROBAXIN) 500 MG Tab     Vitals normal, no red flag symptoms.  OTC meds and conservative measures as discussed  Return to clinic or go to ED if symptoms worsen or persist. Indications for ED discussed at length. Patient voices understanding. Follow-up with your primary care provider in 3-5 days. Red flags discussed. All side effects of medication discussed including allergic response, GI upset, tendon injury, etc.    Please note that this dictation was created using voice recognition software. I have made every reasonable attempt to correct obvious errors, but I expect that there are errors of grammar and possibly content that I did not discover before finalizing the note.

## 2019-10-28 ENCOUNTER — IMMUNIZATION (OUTPATIENT)
Dept: SOCIAL WORK | Facility: CLINIC | Age: 44
End: 2019-10-28

## 2019-10-28 DIAGNOSIS — Z23 NEED FOR VACCINATION: ICD-10-CM

## 2019-10-28 PROCEDURE — 90686 IIV4 VACC NO PRSV 0.5 ML IM: CPT | Performed by: REGISTERED NURSE

## 2021-05-06 ENCOUNTER — HOSPITAL ENCOUNTER (EMERGENCY)
Facility: MEDICAL CENTER | Age: 46
End: 2021-05-06
Attending: EMERGENCY MEDICINE
Payer: COMMERCIAL

## 2021-05-06 ENCOUNTER — APPOINTMENT (OUTPATIENT)
Dept: RADIOLOGY | Facility: MEDICAL CENTER | Age: 46
End: 2021-05-06
Attending: EMERGENCY MEDICINE
Payer: COMMERCIAL

## 2021-05-06 VITALS
HEART RATE: 69 BPM | DIASTOLIC BLOOD PRESSURE: 57 MMHG | SYSTOLIC BLOOD PRESSURE: 115 MMHG | HEIGHT: 60 IN | TEMPERATURE: 98 F | RESPIRATION RATE: 16 BRPM | WEIGHT: 200 LBS | OXYGEN SATURATION: 94 % | BODY MASS INDEX: 39.27 KG/M2

## 2021-05-06 DIAGNOSIS — N23 URETERAL COLIC: ICD-10-CM

## 2021-05-06 LAB
ALBUMIN SERPL BCP-MCNC: 4.1 G/DL (ref 3.2–4.9)
ALBUMIN/GLOB SERPL: 1.1 G/DL
ALP SERPL-CCNC: 92 U/L (ref 30–99)
ALT SERPL-CCNC: 25 U/L (ref 2–50)
ANION GAP SERPL CALC-SCNC: 12 MMOL/L (ref 7–16)
APPEARANCE UR: CLEAR
AST SERPL-CCNC: 25 U/L (ref 12–45)
BACTERIA #/AREA URNS HPF: ABNORMAL /HPF
BASOPHILS # BLD AUTO: 0.4 % (ref 0–1.8)
BASOPHILS # BLD: 0.04 K/UL (ref 0–0.12)
BILIRUB SERPL-MCNC: 0.4 MG/DL (ref 0.1–1.5)
BILIRUB UR QL STRIP.AUTO: NEGATIVE
BUN SERPL-MCNC: 13 MG/DL (ref 8–22)
CALCIUM SERPL-MCNC: 8.8 MG/DL (ref 8.5–10.5)
CHLORIDE SERPL-SCNC: 107 MMOL/L (ref 96–112)
CO2 SERPL-SCNC: 22 MMOL/L (ref 20–33)
COLOR UR: YELLOW
CREAT SERPL-MCNC: 0.92 MG/DL (ref 0.5–1.4)
EOSINOPHIL # BLD AUTO: 0.07 K/UL (ref 0–0.51)
EOSINOPHIL NFR BLD: 0.7 % (ref 0–6.9)
EPI CELLS #/AREA URNS HPF: NEGATIVE /HPF
ERYTHROCYTE [DISTWIDTH] IN BLOOD BY AUTOMATED COUNT: 42.4 FL (ref 35.9–50)
GLOBULIN SER CALC-MCNC: 3.6 G/DL (ref 1.9–3.5)
GLUCOSE SERPL-MCNC: 123 MG/DL (ref 65–99)
GLUCOSE UR STRIP.AUTO-MCNC: NEGATIVE MG/DL
HCT VFR BLD AUTO: 43.1 % (ref 37–47)
HGB BLD-MCNC: 14.5 G/DL (ref 12–16)
HYALINE CASTS #/AREA URNS LPF: ABNORMAL /LPF
IMM GRANULOCYTES # BLD AUTO: 0.08 K/UL (ref 0–0.11)
IMM GRANULOCYTES NFR BLD AUTO: 0.8 % (ref 0–0.9)
KETONES UR STRIP.AUTO-MCNC: ABNORMAL MG/DL
LEUKOCYTE ESTERASE UR QL STRIP.AUTO: NEGATIVE
LIPASE SERPL-CCNC: 25 U/L (ref 11–82)
LYMPHOCYTES # BLD AUTO: 3.23 K/UL (ref 1–4.8)
LYMPHOCYTES NFR BLD: 30.9 % (ref 22–41)
MCH RBC QN AUTO: 31.2 PG (ref 27–33)
MCHC RBC AUTO-ENTMCNC: 33.6 G/DL (ref 33.6–35)
MCV RBC AUTO: 92.7 FL (ref 81.4–97.8)
MICRO URNS: ABNORMAL
MONOCYTES # BLD AUTO: 0.53 K/UL (ref 0–0.85)
MONOCYTES NFR BLD AUTO: 5.1 % (ref 0–13.4)
NEUTROPHILS # BLD AUTO: 6.52 K/UL (ref 2–7.15)
NEUTROPHILS NFR BLD: 62.1 % (ref 44–72)
NITRITE UR QL STRIP.AUTO: NEGATIVE
NRBC # BLD AUTO: 0 K/UL
NRBC BLD-RTO: 0 /100 WBC
PH UR STRIP.AUTO: 5 [PH] (ref 5–8)
PLATELET # BLD AUTO: 269 K/UL (ref 164–446)
PMV BLD AUTO: 9.3 FL (ref 9–12.9)
POTASSIUM SERPL-SCNC: 3.8 MMOL/L (ref 3.6–5.5)
PROT SERPL-MCNC: 7.7 G/DL (ref 6–8.2)
PROT UR QL STRIP: NEGATIVE MG/DL
RBC # BLD AUTO: 4.65 M/UL (ref 4.2–5.4)
RBC # URNS HPF: ABNORMAL /HPF
RBC UR QL AUTO: ABNORMAL
SODIUM SERPL-SCNC: 141 MMOL/L (ref 135–145)
SP GR UR STRIP.AUTO: 1.02
UROBILINOGEN UR STRIP.AUTO-MCNC: 0.2 MG/DL
WBC # BLD AUTO: 10.5 K/UL (ref 4.8–10.8)
WBC #/AREA URNS HPF: ABNORMAL /HPF

## 2021-05-06 PROCEDURE — 80053 COMPREHEN METABOLIC PANEL: CPT

## 2021-05-06 PROCEDURE — 83690 ASSAY OF LIPASE: CPT

## 2021-05-06 PROCEDURE — 85025 COMPLETE CBC W/AUTO DIFF WBC: CPT

## 2021-05-06 PROCEDURE — 96375 TX/PRO/DX INJ NEW DRUG ADDON: CPT

## 2021-05-06 PROCEDURE — 81001 URINALYSIS AUTO W/SCOPE: CPT

## 2021-05-06 PROCEDURE — 74176 CT ABD & PELVIS W/O CONTRAST: CPT

## 2021-05-06 PROCEDURE — 99284 EMERGENCY DEPT VISIT MOD MDM: CPT

## 2021-05-06 PROCEDURE — 700111 HCHG RX REV CODE 636 W/ 250 OVERRIDE (IP): Performed by: EMERGENCY MEDICINE

## 2021-05-06 PROCEDURE — 96374 THER/PROPH/DIAG INJ IV PUSH: CPT

## 2021-05-06 RX ORDER — MORPHINE SULFATE 4 MG/ML
4 INJECTION, SOLUTION INTRAMUSCULAR; INTRAVENOUS ONCE
Status: COMPLETED | OUTPATIENT
Start: 2021-05-06 | End: 2021-05-06

## 2021-05-06 RX ORDER — ONDANSETRON 2 MG/ML
4 INJECTION INTRAMUSCULAR; INTRAVENOUS ONCE
Status: COMPLETED | OUTPATIENT
Start: 2021-05-06 | End: 2021-05-06

## 2021-05-06 RX ADMIN — MORPHINE SULFATE 4 MG: 4 INJECTION INTRAVENOUS at 12:28

## 2021-05-06 RX ADMIN — ONDANSETRON 4 MG: 2 INJECTION INTRAMUSCULAR; INTRAVENOUS at 12:28

## 2021-05-06 NOTE — ED NOTES
Pt given d/c instructions, including follow up care. PIV removed. Questions addressed. Pt dressed self, ambulated out of ER without difficulty. Pt A&Ox4, stable upon d/c.

## 2021-05-06 NOTE — ED TRIAGE NOTES
Chief Complaint   Patient presents with   • Vomiting     X 1 hour, sudden onset with back pain   • Low Back Pain     X 1 hour, started R lower back and now radiates to L lower back, associated with vomiting, sharp pain, denies output changes, denies hx of stones, denies fevers.    • Abdominal Pain     X 20 minutes, RLQ associated with back pain per pt, LBM this morning, denies abd hx     Pt to triage in wheelchair with family, VSS on RA, GCS 15, NAD. Pt had abdominal US done one week ago for RLQ/groin pain but was told it was normal.    Denies all s/sx of covid, denies recent travel, denies fevers.    Pt returned to lobby. Educated on triage process and to inform staff of any changes.     /78   Pulse (!) 58   Temp 36.6 °C (97.8 °F) (Temporal)   Resp 16   Ht 1.524 m (5')   Wt 90.7 kg (200 lb)   SpO2 99%   BMI 39.06 kg/m²

## 2021-05-06 NOTE — ED NOTES
Received report from APOLONIA Leal. Assumed care of patient. Pt resting in bed, RR even and unlabored. Aware of need for UA. Call light in reach. Visitor at bedside.

## 2021-05-06 NOTE — ED PROVIDER NOTES
ED Provider Note    CHIEF COMPLAINT  Chief Complaint   Patient presents with   • Vomiting     X 1 hour, sudden onset with back pain   • Low Back Pain     X 1 hour, started R lower back and now radiates to L lower back, associated with vomiting, sharp pain, denies output changes, denies hx of stones, denies fevers.    • Abdominal Pain     X 20 minutes, RLQ associated with back pain per pt, LBM this morning, denies abd hx       HPI  Alexis John is a 46 y.o. female who presents with a history of ovarian cysts, the patient recently about 2 weeks ago had a constant pain in her right lower quadrant, at that time the patient reports that she had an ultrasound of her kidney and her pelvic area that was normal.  The patient says today she had acute onset of severe right flank pain that now has radiated down towards the front and towards the pelvis.  She has had nausea and vomiting associated with this.  She describes the symptoms as severe.    REVIEW OF SYSTEMS  See HPI for further details. All other systems are negative.     PAST MEDICAL HISTORY   has a past medical history of Hyperlipidemia, Hypothyroidism, Idiopathic neuropathy, Migraine, and Seasonal allergies.    SOCIAL HISTORY  Social History     Tobacco Use   • Smoking status: Never Smoker   • Smokeless tobacco: Never Used   Substance and Sexual Activity   • Alcohol use: No   • Drug use: No   • Sexual activity: Not on file       SURGICAL HISTORY   has a past surgical history that includes abdominal hysterectomy total.    CURRENT MEDICATIONS  Home Medications     Reviewed by Vito Burdick R.N. (Registered Nurse) on 05/06/21 at 1014  Med List Status: <None>   Medication Last Dose Status   albuterol (PROVENTIL) 2.5 mg/0.5 mL Nebu Soln  Active   fluticasone (FLONASE) 50 MCG/ACT nasal spray  Active   gabapentin (NEURONTIN) 400 MG Cap  Active   ibuprofen (MOTRIN) 800 MG Tab  Active   levothyroxine (SYNTHROID) 100 MCG Tab  Active   loratadine (CLARITIN) 10 MG Tab   Active   magnesium gluconate (MAG-G) 500 MG tablet  Active   methocarbamol (ROBAXIN) 500 MG Tab  Active   MethylPREDNISolone (MEDROL DOSEPAK) 4 MG Tablet Therapy Pack  Active   omeprazole (PRILOSEC) 20 MG delayed-release capsule  Active   simvastatin (ZOCOR) 10 MG Tab  Active   trazodone (DESYREL) 100 MG Tab  Active   vitamin D, Ergocalciferol, (DRISDOL) 40625 UNITS Cap capsule  Active                ALLERGIES  No Known Allergies    PHYSICAL EXAM  VITAL SIGNS: /64   Pulse 69   Temp 36.6 °C (97.8 °F) (Temporal)   Resp 15   Ht 1.524 m (5')   Wt 90.7 kg (200 lb)   SpO2 95%   BMI 39.06 kg/m²  @ALVAREZ[[id number]::@   Pulse ox interpretation: I interpret this pulse ox as normal.  Constitutional: Alert.  HENT: No signs of trauma, Bilateral external ears normal, Nose normal.   Eyes: Pupils are equal and reactive, Conjunctiva normal, Non-icteric.   Neck: Normal range of motion, No tenderness, Supple, No stridor.   Lymphatic: No lymphadenopathy noted.   Cardiovascular: Regular rate and rhythm, no murmurs.   Thorax & Lungs: Normal breath sounds, No respiratory distress, No wheezing, No chest tenderness.   Abdomen: Bowel sounds normal, Soft, No tenderness, No masses, No pulsatile masses. No peritoneal signs.  Skin: Warm, Dry, No erythema, No rash.   Back: No bony tenderness, No CVA tenderness.   Extremities: Intact distal pulses, No edema, No tenderness, No cyanosis.  Musculoskeletal: Good range of motion in all major joints. No tenderness to palpation or major deformities noted.   Neurologic: Alert , Normal motor function, Normal sensory function, No focal deficits noted.   Psychiatric: Affect normal, Judgment normal, Mood normal.       DIAGNOSTIC STUDIES / PROCEDURES      LABS  Labs Reviewed   COMP METABOLIC PANEL - Abnormal; Notable for the following components:       Result Value    Glucose 123 (*)     Globulin 3.6 (*)     All other components within normal limits   URINALYSIS - Abnormal; Notable for the following  components:    Ketones Trace (*)     Occult Blood Moderate (*)     All other components within normal limits   URINE MICROSCOPIC (W/UA) - Abnormal; Notable for the following components:    -150 (*)     Bacteria Few (*)     All other components within normal limits   CBC WITH DIFFERENTIAL   LIPASE   ESTIMATED GFR         RADIOLOGY  CT-RENAL COLIC EVALUATION(A/P W/O)   Final Result      1.  Mild RIGHT hydroureteronephrosis without obstructing stone.  Possibly secondary to recently passed stone as there is small calcification dependently in the bladder.   2.  Normal appendix.   3.  No evidence of bowel obstruction.   4.  Fatty infiltration of liver.              COURSE & MEDICAL DECISION MAKING  Pertinent Labs & Imaging studies reviewed. (See chart for details)    Differential diagnosis: Ovarian cyst, ureteral colic, appendicitis, dehydration    IV was established, the patient was given 4 mg IV morphine and 4 mg IV Zofran.  The patient is kept n.p.o.    The patient CT reveals a passed kidney stone in the bladder with hydroureter that is resolving.  The patient symptoms have resolved.  She has no evidence of infection.  At this time she will be discharged to follow-up with her doctor as needed and to return for any worsening symptoms.     The patient will return for new or worsening symptoms and is stable at the time of discharge.    The patient is referred to a primary physician for blood pressure management, diabetic screening, and for all other preventative health concerns.        DISPOSITION:  Patient will be discharged home in stable condition.    FOLLOW UP:  [facility], Emergency Dept  [address] [facility] [address] [phone]    If symptoms worsen    Chad Márquez M.D.  [address] [facility] [id number] [phone]      As needed      OUTPATIENT MEDICATIONS:  New Prescriptions    No medications on file        The patient will return for worsening symptoms and is  stable at the time of discharge. The patient verbalizes understanding and will comply.    FINAL IMPRESSION  1. Ureteral colic                Electronically signed by: Andi Uriarte M.D., 5/6/2021 12:08 PM

## 2021-06-14 ENCOUNTER — HOSPITAL ENCOUNTER (OUTPATIENT)
Dept: LAB | Facility: MEDICAL CENTER | Age: 46
End: 2021-06-14
Attending: INTERNAL MEDICINE
Payer: COMMERCIAL

## 2021-06-18 ENCOUNTER — HOSPITAL ENCOUNTER (OUTPATIENT)
Dept: LAB | Facility: MEDICAL CENTER | Age: 46
End: 2021-06-18
Attending: INTERNAL MEDICINE
Payer: COMMERCIAL

## 2021-06-18 LAB
25(OH)D3 SERPL-MCNC: 59 NG/ML (ref 30–100)
CORTIS SERPL-MCNC: 15.7 UG/DL (ref 0–23)
T3 SERPL-MCNC: 88.4 NG/DL (ref 60–181)
T3FREE SERPL-MCNC: 2.52 PG/ML (ref 2–4.4)
T4 FREE SERPL-MCNC: 1.19 NG/DL (ref 0.93–1.7)
TSH SERPL DL<=0.005 MIU/L-ACNC: 1.28 UIU/ML (ref 0.38–5.33)
VIT B12 SERPL-MCNC: 1690 PG/ML (ref 211–911)

## 2021-06-18 PROCEDURE — 82607 VITAMIN B-12: CPT

## 2021-06-18 PROCEDURE — 84480 ASSAY TRIIODOTHYRONINE (T3): CPT

## 2021-06-18 PROCEDURE — 82533 TOTAL CORTISOL: CPT

## 2021-06-18 PROCEDURE — 84481 FREE ASSAY (FT-3): CPT

## 2021-06-18 PROCEDURE — 82306 VITAMIN D 25 HYDROXY: CPT

## 2021-06-18 PROCEDURE — 84443 ASSAY THYROID STIM HORMONE: CPT

## 2021-06-18 PROCEDURE — 84439 ASSAY OF FREE THYROXINE: CPT

## 2021-06-18 PROCEDURE — 36415 COLL VENOUS BLD VENIPUNCTURE: CPT

## 2021-06-18 PROCEDURE — 82024 ASSAY OF ACTH: CPT

## 2021-06-20 LAB — ACTH PLAS-MCNC: 87 PG/ML (ref 7.2–63.3)

## 2021-06-30 ENCOUNTER — HOSPITAL ENCOUNTER (OUTPATIENT)
Facility: MEDICAL CENTER | Age: 46
End: 2021-06-30
Attending: INTERNAL MEDICINE
Payer: COMMERCIAL

## 2021-06-30 PROCEDURE — 82533 TOTAL CORTISOL: CPT

## 2021-07-07 LAB — CORTIS SAL-MCNC: 0.01 UG/DL

## 2022-09-08 PROBLEM — S73.199A LABRAL TEAR OF HIP JOINT: Status: ACTIVE | Noted: 2022-09-08

## 2022-09-08 PROBLEM — M25.859 FEMORAL ACETABULAR IMPINGEMENT: Status: ACTIVE | Noted: 2022-09-08

## 2022-09-08 PROBLEM — M25.551 RIGHT HIP PAIN: Status: ACTIVE | Noted: 2022-09-08

## 2023-03-10 ENCOUNTER — OFFICE VISIT (OUTPATIENT)
Dept: DERMATOLOGY | Facility: IMAGING CENTER | Age: 48
End: 2023-03-10
Payer: COMMERCIAL

## 2023-03-10 DIAGNOSIS — L90.8 SKIN AGING: ICD-10-CM

## 2023-03-10 DIAGNOSIS — L82.1 SEBORRHEIC KERATOSIS: ICD-10-CM

## 2023-03-10 DIAGNOSIS — Z12.83 SKIN CANCER SCREENING: ICD-10-CM

## 2023-03-10 DIAGNOSIS — L91.8 SKIN TAG: ICD-10-CM

## 2023-03-10 DIAGNOSIS — L81.4 LENTIGO: ICD-10-CM

## 2023-03-10 DIAGNOSIS — D18.01 CHERRY ANGIOMA: ICD-10-CM

## 2023-03-10 DIAGNOSIS — D22.9 NEVUS: ICD-10-CM

## 2023-03-10 PROCEDURE — 99999 PR NO CHARGE: CPT | Performed by: DERMATOLOGY

## 2023-03-10 PROCEDURE — 99203 OFFICE O/P NEW LOW 30 MIN: CPT | Performed by: DERMATOLOGY

## 2023-03-10 RX ORDER — GABAPENTIN 600 MG/1
TABLET ORAL
COMMUNITY
Start: 2023-02-21

## 2023-03-10 NOTE — PROGRESS NOTES
CC: SK/ST treatment - cosmetics    Subjective: Previously seen patient here for SK/ST treatment.     Bothered by appearance  Sites on infra l breast and axilla    ROS: no fevers/chills. No itch.  No cough    PE: Gen:WDWN female in NAD.  Skin: isolated waxy papules on infra left breast and pedunculated skin-colored papules, left/right axilla    A/P: SK/STs, treatment:  -5 sites chosen for trx  -R/B/A reviewed prior to treatment - LN2 20-25 sec X 1-2 cycles after wiping with EtOH. Site under left breast, covered with bandage to reduce risk of rubbing/trauma  -f/u PRN  -$150 paid today at end of service    I have reviewed medications relevant to my specialty.

## 2023-03-10 NOTE — PROGRESS NOTES
CC: GIOVANNY    Subjective: NEW patient here for mole check, skin tags    Denies new, growing, changing, itching or bleeding skin lesions today.  Bothered by tags, desires removal    History of skin cancer: No  History of precancers/actinic keratoses: No  History of biopsies:No  History of blistering/severe sunburns:No  Family history of skin cancer:No  Family history of atypical moles:No      ROS: no fevers/chills. No itch.  No cough  Relevant PMH: seasonal allergies, hypothyroidism  Social: NS    PE: Gen:WDWN female in NAD. Skin: Scalp/face/eyes/lips/neck/chest/back/arms/legs/hands/feet/buttocks - without suspicious lesions noted.  Genitals exam declined  -scattered hyperpigmented macules/papules, appearing benign on torso and extremities  -few cherry red macules, appearing benign  -waxy papule infra left breast  -scattered tags - 2l/2r axilla      A/P: Nevi: benign appearing:  -Reviewed ABCDEs, handout supplied  -Reviewed skin cancer detection/prevention  -RTC PRN growth/changes/concerning features    ST/Lentigos/SKs: benign  -reassurance  -reviewed skin cancer detection/prevention  -counseled on cosmetic treatment PRN    Cherry angioma: benign    F/u 1-2 years/PRN    I have reviewed medications relevant to my specialty.

## 2023-11-03 ENCOUNTER — APPOINTMENT (OUTPATIENT)
Dept: URGENT CARE | Facility: PHYSICIAN GROUP | Age: 48
End: 2023-11-03
Payer: COMMERCIAL

## 2025-01-03 ENCOUNTER — APPOINTMENT (OUTPATIENT)
Dept: URGENT CARE | Facility: PHYSICIAN GROUP | Age: 50
End: 2025-01-03
Payer: COMMERCIAL

## 2025-04-16 ENCOUNTER — RESULTS FOLLOW-UP (OUTPATIENT)
Dept: URGENT CARE | Facility: PHYSICIAN GROUP | Age: 50
End: 2025-04-16

## 2025-04-16 ENCOUNTER — OFFICE VISIT (OUTPATIENT)
Dept: URGENT CARE | Facility: PHYSICIAN GROUP | Age: 50
End: 2025-04-16
Payer: COMMERCIAL

## 2025-04-16 VITALS
HEIGHT: 60 IN | WEIGHT: 213 LBS | RESPIRATION RATE: 18 BRPM | HEART RATE: 106 BPM | TEMPERATURE: 98.9 F | OXYGEN SATURATION: 97 % | DIASTOLIC BLOOD PRESSURE: 80 MMHG | SYSTOLIC BLOOD PRESSURE: 100 MMHG | BODY MASS INDEX: 41.82 KG/M2

## 2025-04-16 DIAGNOSIS — J03.90 TONSILLITIS: ICD-10-CM

## 2025-04-16 LAB
FLUAV RNA SPEC QL NAA+PROBE: NEGATIVE
FLUBV RNA SPEC QL NAA+PROBE: NEGATIVE
RSV RNA SPEC QL NAA+PROBE: NEGATIVE
S PYO DNA SPEC NAA+PROBE: NOT DETECTED
SARS-COV-2 RNA RESP QL NAA+PROBE: NEGATIVE

## 2025-04-16 PROCEDURE — 1126F AMNT PAIN NOTED NONE PRSNT: CPT | Performed by: STUDENT IN AN ORGANIZED HEALTH CARE EDUCATION/TRAINING PROGRAM

## 2025-04-16 PROCEDURE — 99203 OFFICE O/P NEW LOW 30 MIN: CPT | Mod: 25 | Performed by: STUDENT IN AN ORGANIZED HEALTH CARE EDUCATION/TRAINING PROGRAM

## 2025-04-16 PROCEDURE — 3074F SYST BP LT 130 MM HG: CPT | Performed by: STUDENT IN AN ORGANIZED HEALTH CARE EDUCATION/TRAINING PROGRAM

## 2025-04-16 PROCEDURE — 3079F DIAST BP 80-89 MM HG: CPT | Performed by: STUDENT IN AN ORGANIZED HEALTH CARE EDUCATION/TRAINING PROGRAM

## 2025-04-16 PROCEDURE — 87651 STREP A DNA AMP PROBE: CPT | Performed by: STUDENT IN AN ORGANIZED HEALTH CARE EDUCATION/TRAINING PROGRAM

## 2025-04-16 PROCEDURE — 0241U POCT CEPHEID COV-2, FLU A/B, RSV - PCR: CPT | Performed by: STUDENT IN AN ORGANIZED HEALTH CARE EDUCATION/TRAINING PROGRAM

## 2025-04-16 RX ORDER — DEXAMETHASONE SODIUM PHOSPHATE 10 MG/ML
10 INJECTION, SOLUTION INTRA-ARTICULAR; INTRALESIONAL; INTRAMUSCULAR; INTRAVENOUS; SOFT TISSUE ONCE
Status: COMPLETED | OUTPATIENT
Start: 2025-04-16 | End: 2025-04-16

## 2025-04-16 RX ADMIN — DEXAMETHASONE SODIUM PHOSPHATE 10 MG: 10 INJECTION, SOLUTION INTRA-ARTICULAR; INTRALESIONAL; INTRAMUSCULAR; INTRAVENOUS; SOFT TISSUE at 14:16

## 2025-04-16 ASSESSMENT — PAIN SCALES - GENERAL: PAINLEVEL_OUTOF10: NO PAIN

## 2025-04-16 NOTE — PROGRESS NOTES
Subjective:   Alexis John is a 50 y.o. female who presents for Pharyngitis and Body Aches      HPI:  50-year-old female presents to the urgent care for approximately 48 hours of sore throat, body aches, fever, chills, fatigue.  No cough, nasal congestion, runny nose, vomiting, diarrhea.  No respiratory distress or chest pain.    Medications:    albuterol Nebu  CELEBREX PO  dexamethasone  Erenumab Soaj  escitalopram Tabs  fish oil Caps  fluticasone  gabapentin  levothyroxine Tabs  loratadine Tabs  magnesium gluconate  simvastatin Tabs  thiamine Tabs  traZODone Tabs  TYLENOL 8 HOUR PO  Vitamin C Tabs  vitamin D2 (Ergocalciferol) Caps  vitamin D3 Tabs    Allergies: Patient has no known allergies.    Problem List: Alexis John does not have any pertinent problems on file.    Surgical History:  Past Surgical History:   Procedure Laterality Date    ABDOMINAL HYSTERECTOMY TOTAL         Past Social Hx: Alexis John  reports that she has never smoked. She has never used smokeless tobacco. She reports that she does not drink alcohol and does not use drugs.     Past Family Hx:  Alexis John family history is not on file.     Problem list, medications, and allergies reviewed by myself today in Epic.     Objective:     /80 (BP Location: Right arm, Patient Position: Sitting, BP Cuff Size: Adult)   Pulse (!) 106   Temp 37.2 °C (98.9 °F) (Temporal)   Resp 18   Ht 1.524 m (5')   Wt 96.6 kg (213 lb)   SpO2 97%   BMI 41.60 kg/m²     Physical Exam  Vitals reviewed.   Constitutional:       Appearance: She is not toxic-appearing.   HENT:      Nose: Nose normal. No congestion or rhinorrhea.      Mouth/Throat:      Mouth: Mucous membranes are moist.      Pharynx: Uvula midline. Posterior oropharyngeal erythema present.      Tonsils: No tonsillar abscesses. 2+ on the right. 2+ on the left.   Cardiovascular:      Rate and Rhythm: Normal rate and regular rhythm.      Pulses: Normal pulses.      Heart sounds: Normal heart  sounds. No murmur heard.  Pulmonary:      Effort: Pulmonary effort is normal. No respiratory distress.      Breath sounds: Normal breath sounds. No stridor. No wheezing, rhonchi or rales.   Lymphadenopathy:      Cervical: Cervical adenopathy present.         Lab Results/POC Test Results   Results for orders placed or performed in visit on 04/16/25   POCT GROUP A STREP, PCR    Collection Time: 04/16/25  2:54 PM   Result Value Ref Range    POC Group A Strep, PCR Not Detected Not Detected, Invalid   POCT CoV-2, Flu A/B, RSV by PCR    Collection Time: 04/16/25  3:08 PM   Result Value Ref Range    SARS-CoV-2 by PCR Negative Negative, Invalid    Influenza virus A RNA Negative Negative, Invalid    Influenza virus B, PCR Negative Negative, Invalid    RSV, PCR Negative Negative, Invalid           Assessment/Plan:     Diagnosis and associated orders:     1. Tonsillitis  POCT GROUP A STREP, PCR    POCT CoV-2, Flu A/B, RSV by PCR    dexamethasone (Decadron) injection (check route below) 10 mg         Comments/MDM:     Negative viral testing group A strep.  Patient's presentation physical exam findings are consistent with viral etiology and should be self-limited.  Continue home supportive care.  Discussed typical timeframe presentation of symptoms.  Decadron given in clinic.  Return precautions given if symptoms not improving.         Differential diagnosis, natural history, supportive care, and indications for immediate follow-up discussed.    Advised the patient to follow-up with the primary care physician for recheck, reevaluation, and consideration of further management.    Please note that this dictation was created using voice recognition software. I have made a reasonable attempt to correct obvious errors, but I expect that there are errors of grammar and possibly content that I did not discover before finalizing the note.    Electronically signed by Chris Dunne PA-C.

## 2025-04-16 NOTE — LETTER
April 16, 2025    To Whom It May Concern:         This is confirmation that Alexis Rivero Alvaro attended her scheduled appointment with Chris Dunne P.A.-C. on 4/16/25.  Excused from work on 4/14/2025 through 4/18/2025.  May return to work early if feeling better.         If you have any questions please do not hesitate to call me at the phone number listed below.    Sincerely,          Chris Dunne P.A.-C.  [phone]